# Patient Record
Sex: MALE
[De-identification: names, ages, dates, MRNs, and addresses within clinical notes are randomized per-mention and may not be internally consistent; named-entity substitution may affect disease eponyms.]

---

## 2024-04-15 ENCOUNTER — NON-APPOINTMENT (OUTPATIENT)
Age: 24
End: 2024-04-15

## 2024-04-17 ENCOUNTER — APPOINTMENT (OUTPATIENT)
Dept: PAIN MANAGEMENT | Facility: CLINIC | Age: 24
End: 2024-04-17
Payer: COMMERCIAL

## 2024-04-17 VITALS
HEART RATE: 66 BPM | HEIGHT: 71 IN | BODY MASS INDEX: 26.6 KG/M2 | SYSTOLIC BLOOD PRESSURE: 149 MMHG | OXYGEN SATURATION: 98 % | WEIGHT: 190 LBS | DIASTOLIC BLOOD PRESSURE: 93 MMHG

## 2024-04-17 PROBLEM — Z00.00 ENCOUNTER FOR PREVENTIVE HEALTH EXAMINATION: Status: ACTIVE | Noted: 2024-04-17

## 2024-04-17 PROCEDURE — 99204 OFFICE O/P NEW MOD 45 MIN: CPT

## 2024-04-17 NOTE — HISTORY OF PRESENT ILLNESS
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  He did not lose consciousness and did not suffer from amnesia following the injury.  He was not wearing protective gear.  He suffered from headache that remains persistent.  He did have nausea and dizziness and fatigue for approximately 24 hours that have since resolved.  Patient had prior history of concussion in 2015.  Persistent symptoms include mild headache, light sensitivity, difficulty falling asleep, and anxiety. [de-identified] : 4

## 2024-04-17 NOTE — PHYSICAL EXAM
[Person] : Oriented to self [Place] : Oriented to place [Short Term Intact] : Short term memory intact [Remote Intact] : Remote memory intact [Span Intact] : Attention is normal [Concentration Intact] : Concentration is normal [Fluency Intact] : Fluent [Comprehension] : Comprehension is intact [Saccades] : Saccades present [Full Cervical ROM] : Full cervical ROM is present [Normal] : There is no dysmetria. [Cervical Tenderness] : No cervical tenderness present

## 2024-04-17 NOTE — ASSESSMENT
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 Based on subjective complaints and physical examination, the injury is likely secondary to a concussion.     Clinical Findings:    This patient does display symptoms consistent with a concussion.      >> Medication: Take Acetaminophen 500 mg po every 6 hrs prn for headache; consult before using any other over-the-counter meds.      >> Communicated Post-Concussion Lifestyle Recommendations:   Rest Adequately: Emphasized the need for adequate rest and abstaining from physically demanding activities.   Limit Screen & Noise: Advised to reduce device usage and steer clear of loud environments.   Dietary & Substance Caution: Emphasized hydration, considering omega-3 intake, and avoiding alcohol, drugs, and caffeine.   Activity Reintroduction: Advocated a gradual return to regular activities.   Symptom Monitoring: Track symptoms and keep medical personnel updated.   Secondary Injury: Advised against precautions to avoid another head injury during recovery.     > Full Return to Academic Activities:   - Complete removal of accommodations.   - Engage fully in coursework, exams, and extracurriculars.   - Monitor for any recurring symptoms.        >> Return to Play Protocol:   - Gradual return to sports practice under medical supervision.   - Final clearance for full participation requires a visit to the attending physician.       >> Referrals:      - Consider Neurology - eval if headache persists / progresses     Follow-Up: 1 week

## 2024-04-22 ENCOUNTER — APPOINTMENT (OUTPATIENT)
Dept: PAIN MANAGEMENT | Facility: CLINIC | Age: 24
End: 2024-04-22
Payer: COMMERCIAL

## 2024-04-22 VITALS
SYSTOLIC BLOOD PRESSURE: 137 MMHG | HEART RATE: 66 BPM | BODY MASS INDEX: 26.6 KG/M2 | HEIGHT: 71 IN | DIASTOLIC BLOOD PRESSURE: 77 MMHG | WEIGHT: 190 LBS | OXYGEN SATURATION: 98 %

## 2024-04-22 DIAGNOSIS — S06.0X0A CONCUSSION W/OUT LOSS OF CONSCIOUSNESS, INITIAL ENCOUNTER: ICD-10-CM

## 2024-04-22 PROCEDURE — 99214 OFFICE O/P EST MOD 30 MIN: CPT

## 2024-04-22 NOTE — PLAN
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 Based on subjective complaints and physical examination, the injury is likely secondary to a concussion.   Clinical Findings:  This patient does NOT display symptoms consistent with a concussion.   >> Medication: Take Acetaminophen 500 mg po every 6 hrs prn for headache; consult before using any other over-the-counter meds.   >> Communicated Post-Concussion Lifestyle Recommendations:  Rest Adequately: Emphasized the need for adequate rest and abstaining from physically demanding activities.  Limit Screen & Noise: Advised to reduce device usage and steer clear of loud environments.  Dietary & Substance Caution: Emphasized hydration, considering omega-3 intake, and avoiding alcohol, drugs, and caffeine.  Activity Reintroduction: Advocated a gradual return to regular activities.  Symptom Monitoring: Track symptoms and keep medical personnel updated.  Secondary Injury: Advised against precautions to avoid another head injury during recovery.   > Full Return to Academic Activities:  - Complete removal of accommodations.  - Engage fully in coursework, exams, and extracurriculars.  - Monitor for any recurring symptoms.     >> Return to Play Protocol:  - Gradual return to sports practice under medical supervision.  - Final clearance for full participation requires a visit to the attending physician.    >> Referrals:  None   Return to clinic as needed

## 2024-04-22 NOTE — ASSESSMENT
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 Based on subjective complaints and physical examination, the injury is likely secondary to a concussion.     Clinical Findings:    This patient does display symptoms consistent with a concussion.      >> Medication: Take Acetaminophen 500 mg po every 6 hrs prn for headache; consult before using any other over-the-counter meds.    > Full Return to Academic Activities:   - Engage fully in coursework, exams, and extracurriculars.   - Monitor for any recurring symptoms.        >> Return to Play Protocol:   - Full clearance for full participation / competition       >> Referrals:    None warranted at this time     Follow-Up: 1 week

## 2024-04-22 NOTE — PHYSICAL EXAM
[Person] : Oriented to self [Place] : Oriented to place [Short Term Intact] : Short term memory intact [Remote Intact] : Remote memory intact [Span Intact] : Attention is normal [Concentration Intact] : Concentration is normal [Fluency Intact] : Fluent [Comprehension] : Comprehension is intact [Saccades] : No saccades [Nystagmus] : No nystagmus [Full Cervical ROM] : Full cervical ROM is present [Normal] : Patient has a normal gait.

## 2024-04-22 NOTE — HISTORY OF PRESENT ILLNESS
[FreeTextEntry3] :   Date of Injury: 04/06/2024.   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 He did not lose consciousness and did not suffer from amnesia following the injury. He was not wearing protective gear. He suffered from headache that remains persistent. He did have nausea and dizziness and fatigue for approximately 24 hours that have since resolved. Patient had prior history of concussion in 2015. Persistent symptoms include mild headache, light sensitivity, difficulty falling asleep, and anxiety. [de-identified] : Fortunately today on follow-up he reports that his symptoms have abated over the weekend, headache dissipating on Saturday and has been doing well since. He did some light cardio on his own over the weekend without any recurrence of symptoms.  He remains eager to return to swimming. Schoolwork is going well.